# Patient Record
Sex: FEMALE | Race: AMERICAN INDIAN OR ALASKA NATIVE
[De-identification: names, ages, dates, MRNs, and addresses within clinical notes are randomized per-mention and may not be internally consistent; named-entity substitution may affect disease eponyms.]

---

## 2017-12-31 ENCOUNTER — HOSPITAL ENCOUNTER (EMERGENCY)
Dept: HOSPITAL 5 - ED | Age: 22
Discharge: HOME | End: 2017-12-31
Payer: COMMERCIAL

## 2017-12-31 VITALS — SYSTOLIC BLOOD PRESSURE: 129 MMHG | DIASTOLIC BLOOD PRESSURE: 94 MMHG

## 2017-12-31 DIAGNOSIS — R11.2: Primary | ICD-10-CM

## 2017-12-31 DIAGNOSIS — J06.9: ICD-10-CM

## 2017-12-31 DIAGNOSIS — F12.10: ICD-10-CM

## 2017-12-31 DIAGNOSIS — F17.200: ICD-10-CM

## 2017-12-31 LAB
BACTERIA #/AREA URNS HPF: (no result) /HPF
BASOPHILS # (AUTO): 0 K/MM3 (ref 0–0.1)
BASOPHILS NFR BLD AUTO: 0.3 % (ref 0–1.8)
BILIRUB UR QL STRIP: (no result)
BLOOD UR QL VISUAL: (no result)
BUN SERPL-MCNC: 12 MG/DL (ref 7–17)
BUN/CREAT SERPL: 20 %
CALCIUM SERPL-MCNC: 9.4 MG/DL (ref 8.4–10.2)
EOSINOPHIL # BLD AUTO: 0 K/MM3 (ref 0–0.4)
EOSINOPHIL NFR BLD AUTO: 0.6 % (ref 0–4.3)
HCT VFR BLD CALC: 42.8 % (ref 30.3–42.9)
HEMOLYSIS INDEX: 15
HGB BLD-MCNC: 14.4 GM/DL (ref 10.1–14.3)
LIPASE SERPL-CCNC: 28 UNITS/L (ref 13–60)
LYMPHOCYTES # BLD AUTO: 1.4 K/MM3 (ref 1.2–5.4)
LYMPHOCYTES NFR BLD AUTO: 25.8 % (ref 13.4–35)
MCH RBC QN AUTO: 30 PG (ref 28–32)
MCHC RBC AUTO-ENTMCNC: 34 % (ref 30–34)
MCV RBC AUTO: 89 FL (ref 79–97)
MONOCYTES # (AUTO): 0.4 K/MM3 (ref 0–0.8)
MONOCYTES % (AUTO): 7.5 % (ref 0–7.3)
MUCOUS THREADS #/AREA URNS HPF: (no result) /HPF
NITRITE UR QL STRIP: (no result)
PH UR STRIP: 6 [PH] (ref 5–7)
PLATELET # BLD: 248 K/MM3 (ref 140–440)
PROT UR STRIP-MCNC: (no result) MG/DL
RBC # BLD AUTO: 4.82 M/MM3 (ref 3.65–5.03)
RBC #/AREA URNS HPF: 4 /HPF (ref 0–6)
UROBILINOGEN UR-MCNC: 4 MG/DL (ref ?–2)
WBC #/AREA URNS HPF: 2 /HPF (ref 0–6)

## 2017-12-31 PROCEDURE — 99284 EMERGENCY DEPT VISIT MOD MDM: CPT

## 2017-12-31 PROCEDURE — 87400 INFLUENZA A/B EACH AG IA: CPT

## 2017-12-31 PROCEDURE — 85025 COMPLETE CBC W/AUTO DIFF WBC: CPT

## 2017-12-31 PROCEDURE — 36415 COLL VENOUS BLD VENIPUNCTURE: CPT

## 2017-12-31 PROCEDURE — 80048 BASIC METABOLIC PNL TOTAL CA: CPT

## 2017-12-31 PROCEDURE — 83690 ASSAY OF LIPASE: CPT

## 2017-12-31 PROCEDURE — 81025 URINE PREGNANCY TEST: CPT

## 2017-12-31 PROCEDURE — 81001 URINALYSIS AUTO W/SCOPE: CPT

## 2017-12-31 NOTE — EMERGENCY DEPARTMENT REPORT
ED N/V/D HPI





- General


Chief complaint: Nausea/Vomiting/Diarrhea


Stated complaint: NAUSEA/VOMTING


Time Seen by Provider: 12/31/17 13:44


Source: EMS


Mode of arrival: Ambulatory


Limitations: No Limitations





- History of Present Illness


Initial comments: 





22-year-old -American female comes in for complaint of nausea and 

vomiting for 2 days.  Patient also complains of a cough positive chills 

headache and body aches.  She does complain of stomach pain but denies any 

pelvic pain.  She has not taken anything for her headache. 


MD complaint: nausea, vomiting, abdominal pain


-: days(s) (2)


Description of Vomiting: food contents


Associated Abdominal Pain: Yes


Location: diffuse


Radiation: none


Pain Scale: 8


Quality: cramping


Consistency: intermittent


Improves with: none


Worsens with: none


Associated Symptoms: cough, headaches, nausea/vomiting





- Related Data


 Allergies











Allergy/AdvReac Type Severity Reaction Status Date / Time


 


No Known Allergies Allergy   Unverified 12/31/17 11:19














ED Review of Systems


ROS: 


Stated complaint: NAUSEA/VOMTING


Other details as noted in HPI





Constitutional: chills, weakness


Eyes: denies: eye pain, eye discharge, vision change


ENT: denies: ear pain, throat pain


Respiratory: cough


Cardiovascular: denies: chest pain, palpitations


Endocrine: no symptoms reported


Gastrointestinal: abdominal pain, nausea, vomiting


Genitourinary: denies: urgency, dysuria, discharge


Musculoskeletal: denies: back pain, joint swelling, arthralgia


Skin: denies: rash, lesions


Neurological: denies: headache, weakness, paresthesias


Psychiatric: denies: anxiety, depression


Hematological/Lymphatic: denies: easy bleeding, easy bruising





ED Past Medical Hx





- Past Medical History


Previous Medical History?: No





- Surgical History


Past Surgical History?: No





- Social History


Smoking Status: Current Every Day Smoker


Substance Use Type: Marijuana





ED Physical Exam





- General


Limitations: No Limitations


General appearance: alert, in no apparent distress





- Head


Head exam: Present: atraumatic, normocephalic





- Eye


Eye exam: Present: normal appearance





- ENT


ENT exam: Present: mucous membranes moist





- Neck


Neck exam: Present: normal inspection





- Respiratory


Respiratory exam: Present: normal lung sounds bilaterally.  Absent: respiratory 

distress





- Cardiovascular


Cardiovascular Exam: Present: regular rate, normal rhythm.  Absent: systolic 

murmur, diastolic murmur, rubs, gallop





- GI/Abdominal


GI/Abdominal exam: Present: soft, normal bowel sounds





- Neurological Exam


Neurological exam: Present: alert, oriented X3





- Psychiatric


Psychiatric exam: Present: normal affect, normal mood





- Skin


Skin exam: Present: warm, dry, intact, normal color.  Absent: rash





ED Course


 Vital Signs











  12/31/17





  11:19


 


Temperature 98.4 F


 


Pulse Rate 72


 


Respiratory 18





Rate 


 


Blood Pressure 129/94


 


O2 Sat by Pulse 100





Oximetry 














ED Medical Decision Making





- Lab Data


Result diagrams: 


 12/31/17 11:53





 12/31/17 11:53





- Medical Decision Making





Patient's been evaluated by this provider fast track.  Patient's had nausea and 

vomiting for 2 days no active vomiting since being in the ER.  She also 

complains of cold symptoms headache and body aches.  This provider when ahead 

and ordered a in for once the CBC CMP and urine pregnancy tests was ordered and 

within normal limits.  We will give patient acetaminophen 975 mg by mouth as 

well as Zofran 8 mg by mouth.  We'll do a by mouth challenge.  We'll also order 

a influenza test waiting for the results.


Critical care attestation.: 


If time is entered above; I have spent that time in minutes in the direct care 

of this critically ill patient, excluding procedure time.








ED Disposition


Clinical Impression: 


 Nausea and vomiting in adult, URI, acute





Clinical Impression: 


 (Ruled Out): Gastritis


Disposition: DC-01 TO HOME OR SELFCARE


Is pt being admited?: No


Does the pt Need Aspirin: No


Condition: Stable


Instructions:  Acute Nausea and Vomiting (ED), Upper Respiratory Infection (ED)


Additional Instructions: 


Please drink plenty of fluids.  Tylenol or Motrin for headache and body ache.  

Follow-up with your primary care provider is symptoms persist or gets worse.


Referrals: 


PRIMARY CARE,MD [Primary Care Provider] - 3-5 Days


Forms:  Work/School Release Form(ED)

## 2018-04-04 ENCOUNTER — HOSPITAL ENCOUNTER (EMERGENCY)
Dept: HOSPITAL 5 - ED | Age: 23
Discharge: HOME | End: 2018-04-04
Payer: MEDICAID

## 2018-04-04 VITALS — SYSTOLIC BLOOD PRESSURE: 112 MMHG | DIASTOLIC BLOOD PRESSURE: 51 MMHG

## 2018-04-04 DIAGNOSIS — N20.0: Primary | ICD-10-CM

## 2018-04-04 DIAGNOSIS — F12.10: ICD-10-CM

## 2018-04-04 LAB
ALBUMIN SERPL-MCNC: 4.4 G/DL (ref 3.9–5)
ALT SERPL-CCNC: 16 UNITS/L (ref 7–56)
BASOPHILS # (AUTO): 0.1 K/MM3 (ref 0–0.1)
BASOPHILS NFR BLD AUTO: 0.9 % (ref 0–1.8)
BILIRUB UR QL STRIP: (no result)
BLOOD UR QL VISUAL: (no result)
BUN SERPL-MCNC: 11 MG/DL (ref 7–17)
BUN/CREAT SERPL: 16 %
CALCIUM SERPL-MCNC: 9.4 MG/DL (ref 8.4–10.2)
EOSINOPHIL # BLD AUTO: 0 K/MM3 (ref 0–0.4)
EOSINOPHIL NFR BLD AUTO: 0.4 % (ref 0–4.3)
HCT VFR BLD CALC: 39.8 % (ref 30.3–42.9)
HEMOLYSIS INDEX: 30
HGB BLD-MCNC: 13.6 GM/DL (ref 10.1–14.3)
LIPASE SERPL-CCNC: 21 UNITS/L (ref 13–60)
LYMPHOCYTES # BLD AUTO: 3 K/MM3 (ref 1.2–5.4)
LYMPHOCYTES NFR BLD AUTO: 49.9 % (ref 13.4–35)
MCH RBC QN AUTO: 30 PG (ref 28–32)
MCHC RBC AUTO-ENTMCNC: 34 % (ref 30–34)
MCV RBC AUTO: 89 FL (ref 79–97)
MONOCYTES # (AUTO): 0.3 K/MM3 (ref 0–0.8)
MONOCYTES % (AUTO): 5.7 % (ref 0–7.3)
MUCOUS THREADS #/AREA URNS HPF: (no result) /HPF
PH UR STRIP: 5 [PH] (ref 5–7)
PLATELET # BLD: 234 K/MM3 (ref 140–440)
RBC # BLD AUTO: 4.49 M/MM3 (ref 3.65–5.03)
RBC #/AREA URNS HPF: > 182 /HPF (ref 0–6)
UROBILINOGEN UR-MCNC: < 2 MG/DL (ref ?–2)
WBC #/AREA URNS HPF: 7 /HPF (ref 0–6)

## 2018-04-04 PROCEDURE — 99284 EMERGENCY DEPT VISIT MOD MDM: CPT

## 2018-04-04 PROCEDURE — 36415 COLL VENOUS BLD VENIPUNCTURE: CPT

## 2018-04-04 PROCEDURE — 80053 COMPREHEN METABOLIC PANEL: CPT

## 2018-04-04 PROCEDURE — 81025 URINE PREGNANCY TEST: CPT

## 2018-04-04 PROCEDURE — 85025 COMPLETE CBC W/AUTO DIFF WBC: CPT

## 2018-04-04 PROCEDURE — 81001 URINALYSIS AUTO W/SCOPE: CPT

## 2018-04-04 PROCEDURE — 74176 CT ABD & PELVIS W/O CONTRAST: CPT

## 2018-04-04 PROCEDURE — 84703 CHORIONIC GONADOTROPIN ASSAY: CPT

## 2018-04-04 PROCEDURE — 83690 ASSAY OF LIPASE: CPT

## 2018-04-04 NOTE — EMERGENCY DEPARTMENT REPORT
ED Abdominal Pain HPI





- General


Chief Complaint: Abdominal Pain


Stated Complaint: VOMITING


Time Seen by Provider: 04/04/18 13:38


Source: patient


Mode of arrival: Ambulatory


Limitations: No Limitations





- History of Present Illness


Initial Comments: 





This is a 22-year-old female nontoxic, well nourished in appearance, no acute 

signs of distress presents to the ED with c/o of left lower abdominal pain, 

nausea, vomiting, and dirrhea x3 days. Patient stated had a small amount of 

regular bowel movement today.  Patient discussed pain as aching primarily left 

lower abdomen. Patient denies any chest pain, shortness of breathe, fever, 

chills, headache, stiff neck, back pain, numbness or tingling. Patient denies 

any recent travels. Patient denies any allergies or PMH.


MD Complaint: abdominal pain


-: days(s) (3)


Location: [facility]


Radiation: none


Migration to: no migration


Severity: mild


Severity scale (0 -10): 8


Quality: cramping, aching


Consistency: constant


Improves With: nothing


Worsens With: nothing


Associated Symptoms: nausea, vomiting.  denies: diarrhea, fever, chills, 

constipation, dysuria, hematemesis, hematochezia, melena, hematuria, anorexia, 

syncope





- Related Data


 Previous Rx's











 Medication  Instructions  Recorded  Last Taken  Type


 


Ibuprofen [Motrin] 600 mg PO Q8H PRN #30 tablet 04/04/18 Unknown Rx


 


Tamsulosin [Flomax] 0.4 mg PO QDAY #7 cap 04/04/18 Unknown Rx











 Allergies











Allergy/AdvReac Type Severity Reaction Status Date / Time


 


No Known Allergies Allergy   Unverified 12/31/17 11:19














ED Review of Systems


ROS: 


Stated complaint: VOMITING


Other details as noted in HPI





Constitutional: denies: chills, fever


Eyes: denies: eye pain, eye discharge, vision change


ENT: denies: ear pain, throat pain


Respiratory: denies: cough, shortness of breath, wheezing


Cardiovascular: denies: chest pain, palpitations


Endocrine: no symptoms reported


Gastrointestinal: abdominal pain, nausea, vomiting.  denies: diarrhea


Genitourinary: denies: urgency, dysuria, discharge


Musculoskeletal: denies: back pain, joint swelling, arthralgia


Skin: denies: rash, lesions


Neurological: denies: headache, weakness, paresthesias


Psychiatric: denies: anxiety, depression


Hematological/Lymphatic: denies: easy bleeding, easy bruising





ED Past Medical Hx





- Past Medical History


Previous Medical History?: No





- Surgical History


Past Surgical History?: No





- Social History


Smoking Status: Never Smoker


Substance Use Type: Marijuana





- Medications


Home Medications: 


 Home Medications











 Medication  Instructions  Recorded  Confirmed  Last Taken  Type


 


Ibuprofen [Motrin] 600 mg PO Q8H PRN #30 tablet 04/04/18  Unknown Rx


 


Tamsulosin [Flomax] 0.4 mg PO QDAY #7 cap 04/04/18  Unknown Rx














ED Physical Exam





- General


Limitations: No Limitations


General appearance: alert, in no apparent distress





- Head


Head exam: Present: atraumatic, normocephalic





- Eye


Eye exam: Present: normal appearance


Pupils: Present: normal accommodation





- ENT


ENT exam: Present: normal exam, mucous membranes moist





- Neck


Neck exam: Present: normal inspection, full ROM.  Absent: tenderness, 

meningismus





- Respiratory


Respiratory exam: Present: normal lung sounds bilaterally.  Absent: respiratory 

distress, wheezes, rales, rhonchi, stridor, chest wall tenderness, accessory 

muscle use, decreased breath sounds, prolonged expiratory





- Cardiovascular


Cardiovascular Exam: Present: regular rate, normal rhythm, normal heart sounds.

  Absent: bradycardia, tachycardia, irregular rhythm, systolic murmur, 

diastolic murmur, rubs, gallop





- GI/Abdominal


GI/Abdominal exam: Present: soft, tenderness (LLQ), normal bowel sounds.  Absent

: distended, guarding, rebound, rigid, diminished bowel sounds, hyperactive 

bowel sounds, hypoactive bowel sounds, mass, bruit





- Expanded GI/Abdominal Exam


  ** Expanded


GI/Abdominal exam: Absent: psoas sign, obturator sign, heel tap sign, Stephens's 

sign, Rovsing's sign, tenderness at [place]'s Point, ascites





- Rectal


Rectal exam: Present: deferred





- Extremities Exam


Extremities exam: Present: normal inspection, full ROM





- Back Exam


Back exam: Present: normal inspection, full ROM.  Absent: tenderness, CVA 

tenderness (R), CVA tenderness (L), muscle spasm, paraspinal tenderness, rash 

noted





- Neurological Exam


Neurological exam: Present: alert, oriented X3, normal gait





- Psychiatric


Psychiatric exam: Present: normal affect, normal mood





- Skin


Skin exam: Present: warm, dry, intact, normal color.  Absent: rash





ED Course


 Vital Signs











  04/04/18





  12:07


 


Temperature 97.8 F


 


Pulse Rate 61


 


Respiratory 16





Rate 


 


Blood Pressure 115/70


 


O2 Sat by Pulse 100





Oximetry 














- Reevaluation(s)


Reevaluation #1: 





04/04/18 14:03


Patient is speaking in full sentences with no signs of distress noted.





- Consultations


Consultation #1: 





04/04/18 14:04


Patient has been consulted with Dr. Gamez about patient history, physical exam

, and labs and examined and screened patient and agrees to ED plan of care.





ED Medical Decision Making





- Lab Data


Result diagrams: 


 04/04/18 12:52





 04/04/18 12:52





- Medical Decision Making





This is a 22-year-old female that presents with punctate bilateral renal stones 

with left minimal obstruction.  Patient is stable and was examined by me and 

Dr. Gamez. As per Dr. Gamez, patient to be discharged with follow-up.  

Patient is in an was instructed not to operate any machinery after discharge.  

Patient was educated on increasing hydration.  Flomax has been prescribed to 

the patient.  Patient was referred to Follow-up with a primary care doctor in 3-

5 days or if symptoms worsen and continue return to emergency room as soon as 

possible.  At time of discharge, the patient does not seem toxic or ill in 

appearance.  No acute signs of distress noted.  Patient agrees to discharge 

treatment plan of care.  No further questions noted by the patient.


Critical care attestation.: 


If time is entered above; I have spent that time in minutes in the direct care 

of this critically ill patient, excluding procedure time.








ED Disposition


Clinical Impression: 


 Renal stones





Disposition: DC-01 TO HOME OR SELFCARE


Is pt being admited?: No


Does the pt Need Aspirin: No


Condition: Stable


Instructions:  Kidney Stones (ED), Tamsulosin (By mouth)


Additional Instructions: 


Follow-up with a primary care doctor in 3-5 days or if symptoms worsen and 

continue return to emergency room as soon as possible. 


Prescriptions: 


Ibuprofen [Motrin] 600 mg PO Q8H PRN #30 tablet


 PRN Reason: Pain


Tamsulosin [Flomax] 0.4 mg PO QDAY #7 cap


Referrals: 


PRIMARY CARE,MD [Primary Care Provider] - 3-5 Days


TOSHA BAUGH MD [Staff Physician] - 3-5 Days


[facility] [Outside] - 3-5 Days


[facility] [Outside] - 3-5 Days


Forms:  Work/School Release Form(ED)

## 2018-04-04 NOTE — CAT SCAN REPORT
CT ABDOMEN PELVIS WITHOUT CONTRAST:



HISTORY:  Left lower quadrant abdominal pain, nausea and vomiting.



COMPARISON: none.



TECHNIQUE:  Helical CT in 1.25mm intervals without IV contrast. 

Sagittal and coronal reconstructions. 





FINDINGS:



Lung bases: Normal.



Liver: Normal.



Biliary system: Normal.



Pancreas: Normal.



Spleen: Normal.



Kidneys/ureters/bladder: The kidneys are normal size and position. 

There are a few punctate calyceal stones in both kidneys. A 4 x 4 x 6 

mm left ureteral stone is identified at the level of L4. There is 

minimal upstream left hydronephrosis. The bladder is empty but grossly 

normal.



Adrenal glands: Normal.



Aorta: Normal.



Intestines: Normal.



Appendix: Normal.



Pelvic viscera: Normal.



Ascites: None.



Adenopathy: None.



Musculoskeletal: Normal.





IMPRESSION:

Punctate bilateral renal calyceal stones. 4 x 4 x 6 mm left ureteral 

calculus with minimal obstruction.

## 2018-04-04 NOTE — EMERGENCY DEPARTMENT REPORT
Blank Doc





- Documentation


Documentation: 





Patient is a 22-year-old American female who presented with left lower quadrant 

pain for the last 3 days.  Patient has had nausea vomiting and loose stools as 

well..  Patient states she's had some chills and hot flashes also.  Patient 

will have basic blood work drawn and a CT of the abdomen rule out colitis will 

be ordered.  Patient is tender to palpation.